# Patient Record
Sex: MALE
[De-identification: names, ages, dates, MRNs, and addresses within clinical notes are randomized per-mention and may not be internally consistent; named-entity substitution may affect disease eponyms.]

---

## 2018-11-02 PROBLEM — Z00.00 ENCOUNTER FOR PREVENTIVE HEALTH EXAMINATION: Status: ACTIVE | Noted: 2018-11-02

## 2018-11-13 ENCOUNTER — APPOINTMENT (OUTPATIENT)
Dept: NEUROSURGERY | Facility: CLINIC | Age: 63
End: 2018-11-13

## 2019-01-04 ENCOUNTER — APPOINTMENT (OUTPATIENT)
Dept: NEUROSURGERY | Facility: CLINIC | Age: 64
End: 2019-01-04
Payer: COMMERCIAL

## 2019-01-04 VITALS
HEART RATE: 84 BPM | BODY MASS INDEX: 19.33 KG/M2 | HEIGHT: 65 IN | DIASTOLIC BLOOD PRESSURE: 88 MMHG | WEIGHT: 116 LBS | SYSTOLIC BLOOD PRESSURE: 128 MMHG

## 2019-01-04 DIAGNOSIS — Z78.9 OTHER SPECIFIED HEALTH STATUS: ICD-10-CM

## 2019-01-04 DIAGNOSIS — F17.200 NICOTINE DEPENDENCE, UNSPECIFIED, UNCOMPLICATED: ICD-10-CM

## 2019-01-04 DIAGNOSIS — D32.9 BENIGN NEOPLASM OF MENINGES, UNSPECIFIED: ICD-10-CM

## 2019-01-04 DIAGNOSIS — Z87.19 PERSONAL HISTORY OF OTHER DISEASES OF THE DIGESTIVE SYSTEM: ICD-10-CM

## 2019-01-04 DIAGNOSIS — Z85.118 PERSONAL HISTORY OF OTHER MALIGNANT NEOPLASM OF BRONCHUS AND LUNG: ICD-10-CM

## 2019-01-04 PROCEDURE — 99244 OFF/OP CNSLTJ NEW/EST MOD 40: CPT

## 2019-01-08 ENCOUNTER — OTHER (OUTPATIENT)
Age: 64
End: 2019-01-08

## 2019-01-08 PROBLEM — D32.9 MENINGIOMA: Status: ACTIVE | Noted: 2019-01-04

## 2019-01-08 NOTE — PHYSICAL EXAM
[General Appearance - Alert] : alert [General Appearance - In No Acute Distress] : in no acute distress [Oriented To Time, Place, And Person] : oriented to person, place, and time [Impaired Insight] : insight and judgment were intact [Affect] : the affect was normal [Mood] : the mood was normal [Cranial Nerves Optic (II)] : visual acuity intact bilaterally,  pupils equal round and reactive to light [Cranial Nerves Oculomotor (III)] : extraocular motion intact [Cranial Nerves Trigeminal (V)] : facial sensation intact symmetrically [Cranial Nerves Facial (VII)] : face symmetrical [Cranial Nerves Vestibulocochlear (VIII)] : hearing was intact bilaterally [Cranial Nerves Glossopharyngeal (IX)] : tongue and palate midline [Cranial Nerves Accessory (XI - Cranial And Spinal)] : head turning and shoulder shrug symmetric [Motor Tone] : muscle tone was normal in all four extremities [Motor Strength] : muscle strength was normal in all four extremities [Involuntary Movements] : no involuntary movements were seen [Balance] : balance was intact [2+] : Patella left 2+ [No Visual Abnormalities] : no visible abnormalities [Neck Appearance] : the appearance of the neck was normal [] : no respiratory distress [Abnormal Walk] : normal gait [Musculoskeletal - Swelling] : no joint swelling seen [Skin Color & Pigmentation] : normal skin color and pigmentation [FreeTextEntry1] : small framed person - stated that he has always been slim [Person] : oriented to person [Remote Intact] : remote memory intact [Registration Intact] : recent registration memory intact [Span Intact] : the attention span was normal [Concentration Intact] : normal concentrating ability [Fluency] : fluency intact [Comprehension] : comprehension intact [Vocabulary] : adequate range of vocabulary [Cranial Nerves Hypoglossal (XII)] : there was no tongue deviation with protrusion [Sensation Tactile Decrease] : light touch was intact [Allodynia] : no ~T allodynia present [Dysesthesia] : no dysesthesia [Hyperesthesia] : no hyperesthesia [Optic Disc Abnormality] : the optic disc were normal in size and color

## 2019-01-08 NOTE — HISTORY OF PRESENT ILLNESS
[< 3 months] : less than 3 months [de-identified] : The patient was recently diagnosed with lung cancer and had MRI of brain. It was noted with a lesion in the right frontal area, meningioma vs metastatic disease. He was referred here for an evaluation prior to starting treatment. The patient denied any headache, cognitive difficulty, dizziness or balance issues. He reports feeling well and is working. He has not had prior scans.  His oncologist is requesting neurosurgical evaluation prior to starting chemotherapy. \par \par He is still smoking cigarette daily. He has cut down but still unable to quit. He is fully aware of the importance of smoking cession.

## 2019-01-08 NOTE — ASSESSMENT
[FreeTextEntry1] : 63 year old male with meningioma vs metastatic disease\par \par Repeat MRI brain now \par Return for visit\par Neurosurgically cleared to start treatment for lung cancer

## 2019-03-28 ENCOUNTER — OTHER (OUTPATIENT)
Age: 64
End: 2019-03-28

## 2021-11-16 ENCOUNTER — TRANSCRIPTION ENCOUNTER (OUTPATIENT)
Age: 66
End: 2021-11-16